# Patient Record
Sex: MALE | HISPANIC OR LATINO | Employment: FULL TIME | URBAN - METROPOLITAN AREA
[De-identification: names, ages, dates, MRNs, and addresses within clinical notes are randomized per-mention and may not be internally consistent; named-entity substitution may affect disease eponyms.]

---

## 2022-01-18 ENCOUNTER — OFFICE VISIT (OUTPATIENT)
Dept: URGENT CARE | Facility: PHYSICIAN GROUP | Age: 42
End: 2022-01-18
Payer: COMMERCIAL

## 2022-01-18 ENCOUNTER — HOSPITAL ENCOUNTER (OUTPATIENT)
Dept: RADIOLOGY | Facility: MEDICAL CENTER | Age: 42
End: 2022-01-18
Attending: PHYSICIAN ASSISTANT
Payer: COMMERCIAL

## 2022-01-18 ENCOUNTER — HOSPITAL ENCOUNTER (OUTPATIENT)
Facility: MEDICAL CENTER | Age: 42
End: 2022-01-18
Attending: PHYSICIAN ASSISTANT
Payer: COMMERCIAL

## 2022-01-18 VITALS
BODY MASS INDEX: 38.95 KG/M2 | HEIGHT: 68 IN | RESPIRATION RATE: 20 BRPM | SYSTOLIC BLOOD PRESSURE: 128 MMHG | HEART RATE: 83 BPM | OXYGEN SATURATION: 95 % | WEIGHT: 257 LBS | TEMPERATURE: 98.2 F | DIASTOLIC BLOOD PRESSURE: 64 MMHG

## 2022-01-18 DIAGNOSIS — I86.1 LEFT VARICOCELE: ICD-10-CM

## 2022-01-18 DIAGNOSIS — N50.82 SCROTAL PAIN: ICD-10-CM

## 2022-01-18 LAB
APPEARANCE UR: CLEAR
BILIRUB UR STRIP-MCNC: NEGATIVE MG/DL
COLOR UR AUTO: YELLOW
GLUCOSE UR STRIP.AUTO-MCNC: NEGATIVE MG/DL
KETONES UR STRIP.AUTO-MCNC: NEGATIVE MG/DL
LEUKOCYTE ESTERASE UR QL STRIP.AUTO: NEGATIVE
NITRITE UR QL STRIP.AUTO: NEGATIVE
PH UR STRIP.AUTO: 6 [PH] (ref 5–8)
PROT UR QL STRIP: NEGATIVE MG/DL
RBC UR QL AUTO: NORMAL
SP GR UR STRIP.AUTO: 1.03
UROBILINOGEN UR STRIP-MCNC: 0.2 MG/DL

## 2022-01-18 PROCEDURE — 76870 US EXAM SCROTUM: CPT

## 2022-01-18 PROCEDURE — 76857 US EXAM PELVIC LIMITED: CPT

## 2022-01-18 PROCEDURE — 81002 URINALYSIS NONAUTO W/O SCOPE: CPT | Performed by: PHYSICIAN ASSISTANT

## 2022-01-18 PROCEDURE — 99204 OFFICE O/P NEW MOD 45 MIN: CPT | Performed by: PHYSICIAN ASSISTANT

## 2022-01-18 PROCEDURE — 87086 URINE CULTURE/COLONY COUNT: CPT

## 2022-01-18 ASSESSMENT — ENCOUNTER SYMPTOMS
NAUSEA: 0
SHORTNESS OF BREATH: 0
VOMITING: 0
DIARRHEA: 0
FEVER: 0
ABDOMINAL PAIN: 0
EYE PAIN: 0
CONSTIPATION: 0
MYALGIAS: 0
SORE THROAT: 0
CHILLS: 0
COUGH: 0
HEADACHES: 0

## 2022-01-19 DIAGNOSIS — N50.82 SCROTAL PAIN: ICD-10-CM

## 2022-01-19 NOTE — PROGRESS NOTES
"Subjective:   Jayro Denis is a 42 y.o. male who presents for Testicle Pain (pain left testicle,started today)      42-year-old male presents urgent care with left-sided testicular discomfort starting this morning. No injury or trauma, no dysuria, frequency or urgency. He denies any penile discharge, rash or lesions. Patient symptoms seem to come and go, is not precipitated by certain activity. Never had this problem before. No dyschezia or genitourinary or gastrointestinal symptoms noted      Review of Systems   Constitutional: Negative for chills and fever.   HENT: Negative for congestion, ear pain and sore throat.    Eyes: Negative for pain.   Respiratory: Negative for cough and shortness of breath.    Cardiovascular: Negative for chest pain.   Gastrointestinal: Negative for abdominal pain, constipation, diarrhea, nausea and vomiting.   Genitourinary: Negative for dysuria.   Musculoskeletal: Negative for myalgias.   Skin: Negative for rash.   Neurological: Negative for headaches.       Medications, Allergies, and current problem list reviewed today in Epic.     Objective:     /64 (BP Location: Right arm, Patient Position: Sitting, BP Cuff Size: Adult)   Pulse 83   Temp 36.8 °C (98.2 °F) (Temporal)   Resp 20   Ht 1.727 m (5' 8\")   Wt 117 kg (257 lb)   SpO2 95%     Physical Exam  Vitals reviewed.   Constitutional:       Appearance: Normal appearance.   HENT:      Head: Normocephalic and atraumatic.      Right Ear: External ear normal.      Left Ear: External ear normal.      Nose: Nose normal.      Mouth/Throat:      Mouth: Mucous membranes are moist.   Eyes:      Conjunctiva/sclera: Conjunctivae normal.   Cardiovascular:      Rate and Rhythm: Normal rate.   Pulmonary:      Effort: Pulmonary effort is normal.   Abdominal:      Tenderness: There is no abdominal tenderness.   Genitourinary:     Comments: No significant lymphadenopathy, rash or lesions. Normal external genital appearance, uncircumcised " male. There is a posterior scrotal mass on the left side that appears to be discrete from the epididymis. The testicle on the left symmetrical without any nodules. Right side is unremarkable.  equivocal whether the left-sided mass is contiguous with the left-sided inguinal canal.  Skin:     General: Skin is warm and dry.      Capillary Refill: Capillary refill takes less than 2 seconds.   Neurological:      Mental Status: He is alert and oriented to person, place, and time.         RADIOLOGY RESULTS   [id number]    Result Date: 1/18/2022 1/18/2022 5:37 PM HISTORY/REASON FOR EXAM: Pain; mild scrotal pain starting today, left sided. mild bulge posterior scrotum. TECHNIQUE/EXAM DESCRIPTION: Real-time sonography of the scrotum was performed with gray-scale, color and duplex Doppler imaging. COMPARISON: None FINDINGS: The right testis measures 4.66 cm x 2.18 cm x 3.79 cm. Normal in size and echotexture. Normal vascularity on color Doppler. No intratesticular mass. The left testis measures  4.33 cm x 2.70 cm x 3.63 cm. Normal in size and echotexture. Normal vascularity on color Doppler. No intratesticular mass. Vascular flow is symmetric. Appearance of the epididymides are within normal limits. No abnormal volume hydrocele is detected. There is a left varicocele.     1.  Normal sonographic appearance of both testicles 2.  Left varicocele    US-INGUINAL HERNIA    Result Date: 1/18/2022 1/18/2022 5:37 PM HISTORY/REASON FOR EXAM:  Pain; left inguinal hernia vs scrotal abberancy. exam equivocal. Pain TECHNIQUE/EXAM DESCRIPTION AND NUMBER OF VIEWS:  Left inguinal ultrasound with and without Valsalva maneuver. COMPARISON: None FINDINGS: There is no hernia. There is no mass or fluid collection.     Negative left inguinal ultrasound.            Assessment/Plan:     Diagnosis and associated orders:     1. Scrotal pain  [id number]    US-INGUINAL HERNIA    POCT Urinalysis    URINE CULTURE(NEW)   2. Left  varicocele        Comments/MDM:     • No signs or symptoms to support torsion, ultrasound reveals wonderful blood flow. Finding of left-sided varicocele which perfectly explains the patient's pain pattern. Discussed supportive care and management of this and indications for repeat evaluation. We will send urine culture on the off chance this also represents a concomitant infection but the probability of this is low. Urinalysis is relatively unremarkable. Contact the patient via telephone with the ultrasound results and he demonstrated understanding of instructions.         Differential diagnosis, natural history, supportive care, and indications for immediate follow-up discussed.    Advised the patient to follow-up with the primary care physician for recheck, reevaluation, and consideration of further management.    Please note that this dictation was created using voice recognition software. I have made a reasonable attempt to correct obvious errors, but I expect that there are errors of grammar and possibly content that I did not discover before finalizing the note.    This note was electronically signed by Tico Stark PA-C

## 2022-01-21 LAB
BACTERIA UR CULT: NORMAL
SIGNIFICANT IND 70042: NORMAL
SITE SITE: NORMAL
SOURCE SOURCE: NORMAL